# Patient Record
Sex: FEMALE | Race: AMERICAN INDIAN OR ALASKA NATIVE
[De-identification: names, ages, dates, MRNs, and addresses within clinical notes are randomized per-mention and may not be internally consistent; named-entity substitution may affect disease eponyms.]

---

## 2021-02-24 ENCOUNTER — HOSPITAL ENCOUNTER (EMERGENCY)
Dept: HOSPITAL 5 - ED | Age: 36
Discharge: HOME | End: 2021-02-24
Payer: MEDICAID

## 2021-02-24 VITALS — DIASTOLIC BLOOD PRESSURE: 90 MMHG | SYSTOLIC BLOOD PRESSURE: 130 MMHG

## 2021-02-24 DIAGNOSIS — K29.70: Primary | ICD-10-CM

## 2021-02-24 DIAGNOSIS — Z79.899: ICD-10-CM

## 2021-02-24 DIAGNOSIS — F17.200: ICD-10-CM

## 2021-02-24 LAB
ALBUMIN SERPL-MCNC: 4.1 G/DL (ref 3.9–5)
ALT SERPL-CCNC: 10 UNITS/L (ref 7–56)
BASOPHILS # (AUTO): 0 K/MM3 (ref 0–0.1)
BASOPHILS NFR BLD AUTO: 0.6 % (ref 0–1.8)
BILIRUB UR QL STRIP: (no result)
BLOOD UR QL VISUAL: (no result)
BUN SERPL-MCNC: 7 MG/DL (ref 7–17)
BUN/CREAT SERPL: 14 %
CALCIUM SERPL-MCNC: 8.8 MG/DL (ref 8.4–10.2)
EOSINOPHIL # BLD AUTO: 0.1 K/MM3 (ref 0–0.4)
EOSINOPHIL NFR BLD AUTO: 1 % (ref 0–4.3)
HCT VFR BLD CALC: 41.6 % (ref 30.3–42.9)
HEMOLYSIS INDEX: 7
HGB BLD-MCNC: 14.2 GM/DL (ref 10.1–14.3)
LYMPHOCYTES # BLD AUTO: 2 K/MM3 (ref 1.2–5.4)
LYMPHOCYTES NFR BLD AUTO: 24.4 % (ref 13.4–35)
MCHC RBC AUTO-ENTMCNC: 34 % (ref 30–34)
MCV RBC AUTO: 100 FL (ref 79–97)
MONOCYTES # (AUTO): 0.8 K/MM3 (ref 0–0.8)
MONOCYTES % (AUTO): 9.5 % (ref 0–7.3)
MUCOUS THREADS #/AREA URNS HPF: (no result) /HPF
PH UR STRIP: 6 [PH] (ref 5–7)
PLATELET # BLD: 312 K/MM3 (ref 140–440)
RBC # BLD AUTO: 4.15 M/MM3 (ref 3.65–5.03)
RBC #/AREA URNS HPF: 7 /HPF (ref 0–6)
UROBILINOGEN UR-MCNC: 4 MG/DL (ref ?–2)
WBC #/AREA URNS HPF: 2 /HPF (ref 0–6)

## 2021-02-24 PROCEDURE — 99284 EMERGENCY DEPT VISIT MOD MDM: CPT

## 2021-02-24 PROCEDURE — 80053 COMPREHEN METABOLIC PANEL: CPT

## 2021-02-24 PROCEDURE — 96361 HYDRATE IV INFUSION ADD-ON: CPT

## 2021-02-24 PROCEDURE — 85025 COMPLETE CBC W/AUTO DIFF WBC: CPT

## 2021-02-24 PROCEDURE — 84702 CHORIONIC GONADOTROPIN TEST: CPT

## 2021-02-24 PROCEDURE — 76802 OB US < 14 WKS ADDL FETUS: CPT

## 2021-02-24 PROCEDURE — 81001 URINALYSIS AUTO W/SCOPE: CPT

## 2021-02-24 PROCEDURE — 83690 ASSAY OF LIPASE: CPT

## 2021-02-24 PROCEDURE — 76801 OB US < 14 WKS SINGLE FETUS: CPT

## 2021-02-24 PROCEDURE — 36415 COLL VENOUS BLD VENIPUNCTURE: CPT

## 2021-02-24 PROCEDURE — 96374 THER/PROPH/DIAG INJ IV PUSH: CPT

## 2021-02-24 NOTE — ULTRASOUND REPORT
ULTRASOUND OBSTETRIC 



INDICATION:

Pelvic pain. 9 weeks pregnant.



TECHNIQUE:

Transabdominal and Transvaginal.



COMPARISON:

None available.



FINDINGS:

GESTATIONAL SAC: Well-defined single sac of oval shape and intrauterine in location. 

YOLK SAC: No significant abnormality.



EMBRYO/FETUS A: No significant abnormality. 

- Crown-Rump Length = 2.3 cm = 9 weeks, 0 day(s).

- Fetal Heart Rate = 176 beats per minute. 



EMBRYO/FETUS B: No significant abnormality. 

- Crown-Rump Length = 2.6 cm = 9 weeks, 3 day(s).

- Fetal Heart Rate = 177 beats per minute. 





ADNEXA: No significant abnormality.

FREE FLUID: None.



ADDITIONAL FINDINGS: None.



IMPRESSION:

Living monochorionic monoamniotic intrauterine twin pregnancy with estimated sonographic age of 9 wee
ks, 3 day(s).



Signer Name: Kobe Ritchie MD 

Signed: 2/24/2021 8:51 PM

Workstation Name: [id number]

## 2021-02-24 NOTE — EMERGENCY DEPARTMENT REPORT
ED General Adult HPI





- General


Chief complaint: Nausea/Vomiting/Diarrhea


Stated complaint: FOOD POISONING


Time Seen by Provider: 21 17:15


Source: patient


Mode of arrival: Ambulatory


Limitations: No Limitations





- History of Present Illness


Initial comments: 





36-year-old -American female patient presents with complaints of sudden 

onset of nausea and vomiting starting around 4 AM.  Patient states she is 8 

weeks pregnant and is currently following with an OB/GYN whose name she cannot 

remember at this time.  She is G7, .  She reports she does have some lower 

abdominal pain that started after the vomiting, however she denies any dysuria

/hematuria/vaginal discharge, vaginal bleeding, dyspareunia, 

diarrhea/constipation, or fever/chills/sweats.  No loss of taste/smell, chest 

pain, cough, or shortness of breath per patient.  She rates her current pain as 

a 5/10 in severity.  She states that she did eat out last night around 10 PM.  

No other prior medical history per patient.





- Related Data


                                  Previous Rx's











 Medication  Instructions  Recorded  Last Taken  Type


 


Metoclopramide [Reglan] 10 mg PO TID PRN #10 tab 21 Unknown Rx


 


diphenhydrAMINE [Benadryl CAP] 25 mg PO TID PRN #10 capsule 21 Unknown Rx











                                    Allergies











Allergy/AdvReac Type Severity Reaction Status Date / Time


 


No Known Allergies Allergy   Verified 21 17:06














ED Review of Systems


ROS: 


Stated complaint: FOOD POISONING


Other details as noted in HPI





Constitutional: denies: chills, diaphoresis, fever, malaise, weakness


ENT: denies: ear pain


Respiratory: denies: cough, shortness of breath


Cardiovascular: denies: chest pain


Gastrointestinal: abdominal pain, nausea, vomiting.  denies: diarrhea, 

constipation, hematemesis, melena, hematochezia


Genitourinary: denies: urgency, dysuria, frequency, hematuria, discharge, 

abnormal menses, dyspareunia


Musculoskeletal: denies: back pain


Skin: denies: rash, lesions, change in color


Neurological: denies: headache


Hematological/Lymphatic: denies: swollen glands





ED Past Medical Hx





- Social History


Smoking Status: Current Every Day Smoker


Substance Use Type: Alcohol





- Medications


Home Medications: 


                                Home Medications











 Medication  Instructions  Recorded  Confirmed  Last Taken  Type


 


Metoclopramide [Reglan] 10 mg PO TID PRN #10 tab 21  Unknown Rx


 


diphenhydrAMINE [Benadryl CAP] 25 mg PO TID PRN #10 capsule 21  Unknown Rx














ED Physical Exam





- General


Limitations: No Limitations


General appearance: alert, in no apparent distress, other (Active vomiting)





- Head


Head exam: Present: atraumatic, normocephalic





- Eye


Eye exam: Present: scleral icterus.  Absent: PERRL





- ENT


ENT exam: Present: mucous membranes moist





- Neck


Neck exam: Present: normal inspection





- Respiratory


Respiratory exam: Present: normal lung sounds bilaterally.  Absent: respiratory 

distress





- Cardiovascular


Cardiovascular Exam: Present: regular rate, normal rhythm





- GI/Abdominal


GI/Abdominal exam: Present: soft, tenderness (Mild suprapubic tenderness to 

palpation), normal bowel sounds.  Absent: distended, guarding, rebound, rigid





- Extremities Exam


Extremities exam: Present: full ROM





- Back Exam


Back exam: Absent: CVA tenderness (R), CVA tenderness (L)





- Neurological Exam


Neurological exam: Present: alert, oriented X3, normal gait





- Psychiatric


Psychiatric exam: Present: normal affect, normal mood





- Skin


Skin exam: Present: warm, dry, intact, normal color.  Absent: rash, cyanosis, 

diaphoretic, ecchymosis





ED Course


                                   Vital Signs











  21





  17:05


 


Temperature 98.7 F


 


Pulse Rate 74


 


Respiratory 22





Rate 


 


Blood Pressure 140/73


 


O2 Sat by Pulse 95





Oximetry 














ED Medical Decision Making





- Lab Data


Result diagrams: 


                                 21 18:08





                                 21 18:08








                                   Lab Results











  21 Range/Units





  18:08 18:08 18:08 


 


WBC  8.3    (4.5-11.0)  K/mm3


 


RBC  4.15    (3.65-5.03)  M/mm3


 


Hgb  14.2    (10.1-14.3)  gm/dl


 


Hct  41.6    (30.3-42.9)  %


 


MCV  100 H    (79-97)  fl


 


MCH  34 H    (28-32)  pg


 


MCHC  34    (30-34)  %


 


RDW  12.7 L    (13.2-15.2)  %


 


Plt Count  312    (140-440)  K/mm3


 


Lymph % (Auto)  24.4    (13.4-35.0)  %


 


Mono % (Auto)  9.5 H    (0.0-7.3)  %


 


Eos % (Auto)  1.0    (0.0-4.3)  %


 


Baso % (Auto)  0.6    (0.0-1.8)  %


 


Lymph # (Auto)  2.0    (1.2-5.4)  K/mm3


 


Mono # (Auto)  0.8    (0.0-0.8)  K/mm3


 


Eos # (Auto)  0.1    (0.0-0.4)  K/mm3


 


Baso # (Auto)  0.0    (0.0-0.1)  K/mm3


 


Seg Neutrophils %  64.5    (40.0-70.0)  %


 


Seg Neutrophils #  5.3    (1.8-7.7)  K/mm3


 


Sodium   137   (137-145)  mmol/L


 


Potassium   3.5 L   (3.6-5.0)  mmol/L


 


Chloride   103.5   ()  mmol/L


 


Carbon Dioxide   26   (22-30)  mmol/L


 


Anion Gap   11   mmol/L


 


BUN   7   (7-17)  mg/dL


 


Creatinine   0.5 L   (0.6-1.2)  mg/dL


 


Estimated GFR   > 60   ml/min


 


BUN/Creatinine Ratio   14   %


 


Glucose   80   ()  mg/dL


 


Calcium   8.8   (8.4-10.2)  mg/dL


 


Total Bilirubin   0.50   (0.1-1.2)  mg/dL


 


AST   13   (5-40)  units/L


 


ALT   10   (7-56)  units/L


 


Alkaline Phosphatase   38   ()  units/L


 


Total Protein   6.9   (6.3-8.2)  g/dL


 


Albumin   4.1   (3.9-5)  g/dL


 


Albumin/Globulin Ratio   1.5   %


 


Lipase   14   (13-60)  units/L


 


HCG, Quant    148019 H  (0-4)  mIU/mL


 


Urine Color     (Yellow)  


 


Urine Turbidity     (Clear)  


 


Urine pH     (5.0-7.0)  


 


Ur Specific Gravity     (1.003-1.030)  


 


Urine Protein     (Negative)  mg/dL


 


Urine Glucose (UA)     (Negative)  mg/dL


 


Urine Ketones     (Negative)  mg/dL


 


Urine Blood     (Negative)  


 


Urine Nitrite     (Negative)  


 


Urine Bilirubin     (Negative)  


 


Urine Urobilinogen     (<2.0)  mg/dL


 


Ur Leukocyte Esterase     (Negative)  


 


Urine WBC (Auto)     (0.0-6.0)  /HPF


 


Urine RBC (Auto)     (0.0-6.0)  /HPF


 


U Epithel Cells (Auto)     (0-13.0)  /HPF


 


Urine Mucus     /HPF














  21 Range/Units





  18:52 


 


WBC   (4.5-11.0)  K/mm3


 


RBC   (3.65-5.03)  M/mm3


 


Hgb   (10.1-14.3)  gm/dl


 


Hct   (30.3-42.9)  %


 


MCV   (79-97)  fl


 


MCH   (28-32)  pg


 


MCHC   (30-34)  %


 


RDW   (13.2-15.2)  %


 


Plt Count   (140-440)  K/mm3


 


Lymph % (Auto)   (13.4-35.0)  %


 


Mono % (Auto)   (0.0-7.3)  %


 


Eos % (Auto)   (0.0-4.3)  %


 


Baso % (Auto)   (0.0-1.8)  %


 


Lymph # (Auto)   (1.2-5.4)  K/mm3


 


Mono # (Auto)   (0.0-0.8)  K/mm3


 


Eos # (Auto)   (0.0-0.4)  K/mm3


 


Baso # (Auto)   (0.0-0.1)  K/mm3


 


Seg Neutrophils %   (40.0-70.0)  %


 


Seg Neutrophils #   (1.8-7.7)  K/mm3


 


Sodium   (137-145)  mmol/L


 


Potassium   (3.6-5.0)  mmol/L


 


Chloride   ()  mmol/L


 


Carbon Dioxide   (22-30)  mmol/L


 


Anion Gap   mmol/L


 


BUN   (7-17)  mg/dL


 


Creatinine   (0.6-1.2)  mg/dL


 


Estimated GFR   ml/min


 


BUN/Creatinine Ratio   %


 


Glucose   ()  mg/dL


 


Calcium   (8.4-10.2)  mg/dL


 


Total Bilirubin   (0.1-1.2)  mg/dL


 


AST   (5-40)  units/L


 


ALT   (7-56)  units/L


 


Alkaline Phosphatase   ()  units/L


 


Total Protein   (6.3-8.2)  g/dL


 


Albumin   (3.9-5)  g/dL


 


Albumin/Globulin Ratio   %


 


Lipase   (13-60)  units/L


 


HCG, Quant   (0-4)  mIU/mL


 


Urine Color  Yellow  (Yellow)  


 


Urine Turbidity  Clear  (Clear)  


 


Urine pH  6.0  (5.0-7.0)  


 


Ur Specific Gravity  1.033 H  (1.003-1.030)  


 


Urine Protein  30 mg/dl  (Negative)  mg/dL


 


Urine Glucose (UA)  Neg  (Negative)  mg/dL


 


Urine Ketones  20  (Negative)  mg/dL


 


Urine Blood  Sm  (Negative)  


 


Urine Nitrite  Neg  (Negative)  


 


Urine Bilirubin  Neg  (Negative)  


 


Urine Urobilinogen  4.0  (<2.0)  mg/dL


 


Ur Leukocyte Esterase  Neg  (Negative)  


 


Urine WBC (Auto)  2.0  (0.0-6.0)  /HPF


 


Urine RBC (Auto)  7.0  (0.0-6.0)  /HPF


 


U Epithel Cells (Auto)  4.0  (0-13.0)  /HPF


 


Urine Mucus  3+  /HPF














- Radiology Data


Radiology results: report reviewed








 ULTRASOUND OBSTETRIC 





INDICATION: 


Pelvic pain. 9 weeks pregnant. 





TECHNIQUE: 


Transabdominal and Transvaginal. 





COMPARISON: 


None available. 





FINDINGS: 


GESTATIONAL SAC: Well-defined single sac of oval shape and intrauterine in 

location. 


YOLK SAC: No significant abnormality. 





EMBRYO/FETUS A: No significant abnormality. 


- Crown-Rump Length = 2.3 cm = 9 weeks, 0 day(s). 


- Fetal Heart Rate = 176 beats per minute. 





EMBRYO/FETUS B: No significant abnormality. 


- Crown-Rump Length = 2.6 cm = 9 weeks, 3 day(s). 


- Fetal Heart Rate = 177 beats per minute. 








ADNEXA: No significant abnormality. 


FREE FLUID: None. 





ADDITIONAL FINDINGS: None. 





IMPRESSION: 


Living monochorionic monoamniotic intrauterine twin pregnancy with estimated 

sonographic age of 9 


 weeks, 3 day(s). 





- Medical Decision Making











36-year-old -American female patient presents with complaints of sudden 

onset of nausea and vomiting starting around 4 AM.  Patient states she is 8 

weeks pregnant and is currently following with an OB/GYN whose name she cannot 

remember at this time.  She is G7, .  She reports she does have some lower 

abdominal pain that started after the vomiting, however she denies any d

ysuria/hematuria/vaginal discharge, vaginal bleeding, dyspareunia, 

diarrhea/constipation, or fever/chills/sweats.  No loss of taste/smell, chest 

pain, cough, or shortness of breath per patient.  She rates her current pain as 

a 5/10 in severity.  She states that she did eat out last night around 10 PM.  

No other prior medical history per patient.








Patient given Reglan and Benadryl and 1 L of lactated Ringer's.  Her vomiting 

has been controlled here in the ED and she is tolerating fluids p.o.  No acute 

abnormalities noted on CBC, CMP, or UA.  Ultrasound shows viable twin IUP 

without any acute abnormalities.  Vitals remain normal.  Patient is well-

appearing and stable for discharge home.  Prescription for Reglan and Benadryl 

given for home.  Suspect patient symptoms were due to viral gastritis and 

recommend follow-up with primary care in 3 to 5 days.  Signs and symptoms that 

should prompt immediate return to the ED were discussed in great detail with 

patient who verbalized understanding.


Critical care attestation.: 


If time is entered above; I have spent that time in minutes in the direct care 

of this critically ill patient, excluding procedure time.








ED Disposition


Clinical Impression: 


 Viral gastritis





Disposition: DC- TO HOME OR SELFCARE


Is pt being admited?: No


Condition: Stable


Instructions:  Gastritis, Adult


Prescriptions: 


diphenhydrAMINE [Benadryl CAP] 25 mg PO TID PRN #10 capsule


 PRN Reason: Nausea


Metoclopramide [Reglan] 10 mg PO TID PRN #10 tab


 PRN Reason: Nausea


Referrals: 


PRIMARY CARE,MD [Primary Care Provider] - 2-3 Days

## 2021-02-24 NOTE — ULTRASOUND REPORT
ULTRASOUND OBSTETRIC 



INDICATION:

Pelvic pain. 9 weeks pregnant.



TECHNIQUE:

Transabdominal and Transvaginal.



COMPARISON:

None available.



FINDINGS:

GESTATIONAL SAC: Well-defined single sac of oval shape and intrauterine in location. 

YOLK SAC: No significant abnormality.



EMBRYO/FETUS A: No significant abnormality. 

- Crown-Rump Length = 2.3 cm = 9 weeks, 0 day(s).

- Fetal Heart Rate = 176 beats per minute. 



EMBRYO/FETUS B: No significant abnormality. 

- Crown-Rump Length = 2.6 cm = 9 weeks, 3 day(s).

- Fetal Heart Rate = 177 beats per minute. 





ADNEXA: No significant abnormality.

FREE FLUID: None.



ADDITIONAL FINDINGS: None.



IMPRESSION:

Living monochorionic monoamniotic intrauterine twin pregnancy with estimated sonographic age of 9 wee
ks, 3 day(s).



Signer Name: Kobe Ritchie MD 

Signed: 2/24/2021 8:51 PM

Workstation Name: [id number]

## 2021-12-14 ENCOUNTER — HOSPITAL ENCOUNTER (EMERGENCY)
Dept: HOSPITAL 5 - ED | Age: 36
End: 2021-12-14
Payer: MEDICAID

## 2021-12-14 VITALS — DIASTOLIC BLOOD PRESSURE: 103 MMHG | SYSTOLIC BLOOD PRESSURE: 138 MMHG

## 2021-12-14 DIAGNOSIS — Z53.21: ICD-10-CM

## 2021-12-14 DIAGNOSIS — M54.9: Primary | ICD-10-CM

## 2021-12-15 ENCOUNTER — HOSPITAL ENCOUNTER (EMERGENCY)
Dept: HOSPITAL 5 - ED | Age: 36
Discharge: HOME | End: 2021-12-15
Payer: MEDICAID

## 2021-12-15 VITALS — DIASTOLIC BLOOD PRESSURE: 80 MMHG | SYSTOLIC BLOOD PRESSURE: 122 MMHG

## 2021-12-15 DIAGNOSIS — F17.200: ICD-10-CM

## 2021-12-15 DIAGNOSIS — M54.50: Primary | ICD-10-CM

## 2021-12-15 LAB
BILIRUB UR QL STRIP: (no result)
BLOOD UR QL VISUAL: (no result)
MUCOUS THREADS #/AREA URNS HPF: (no result) /HPF
PH UR STRIP: 5 [PH] (ref 5–7)
PROT UR STRIP-MCNC: (no result) MG/DL
RBC #/AREA URNS HPF: 13 /HPF (ref 0–6)
UROBILINOGEN UR-MCNC: < 2 MG/DL (ref ?–2)
WBC #/AREA URNS HPF: 3 /HPF (ref 0–6)

## 2021-12-15 PROCEDURE — 99283 EMERGENCY DEPT VISIT LOW MDM: CPT

## 2021-12-15 PROCEDURE — 81025 URINE PREGNANCY TEST: CPT

## 2021-12-15 PROCEDURE — 96372 THER/PROPH/DIAG INJ SC/IM: CPT

## 2021-12-15 PROCEDURE — 81001 URINALYSIS AUTO W/SCOPE: CPT

## 2021-12-15 NOTE — EMERGENCY DEPARTMENT REPORT
ED General Adult HPI





- General


Chief complaint: Back Pain/Injury


Stated complaint: BACK PAIN


Time Seen by Provider: 12/15/21 07:16


Source: patient


Mode of arrival: Ambulatory


Limitations: No Limitations





- History of Present Illness


Initial comments: 





36-year-old -American female patient presents with complaints of sudden 

onset of low back pain starting yesterday.  Patient states the pain began 

suddenly when she went to sit in a deep seated chair.  She describes the pain as

sharp and stabbing and states it radiates down her right leg.  Pain worsens with

trying to get up from a seated position and ambulation per patient.  She denies 

any past medical history including history of cancer.  No loss of bladder/bowel 

control, numbness/tingling/weakness in her limbs, or difficulty with movement of

the legs per patient.  She has not tried any medications for her symptoms per 

patient.  She rates the pain as a 9/10 in severity.


Severity scale (0 -10): 8





- Related Data


                                  Previous Rx's











 Medication  Instructions  Recorded  Last Taken  Type


 


Metoclopramide [Reglan] 10 mg PO TID PRN #10 tab 21 Unknown Rx


 


diphenhydrAMINE [Benadryl CAP] 25 mg PO TID PRN #10 capsule 21 Unknown Rx


 


Naproxen 500 mg PO BID PRN #20 tablet 12/15/21 Unknown Rx


 


Prednisone [predniSONE 10 mg 10 mg PO .TAPER #1 tab.ds.pk 12/15/21 Unknown Rx





(6-Day Pack, 21 Tabs)]    


 


methocarbamoL [Methocarbamol] 750 - 1,500 mg PO TID PRN #30 12/15/21 Unknown Rx





 tablet   











                                    Allergies











Allergy/AdvReac Type Severity Reaction Status Date / Time


 


No Known Allergies Allergy   Verified 12/15/21 05:15














ED Review of Systems


ROS: 


Stated complaint: BACK PAIN


Other details as noted in HPI





Constitutional: denies: chills, fever, malaise


Respiratory: denies: shortness of breath


Cardiovascular: denies: chest pain


Gastrointestinal: denies: abdominal pain, nausea, vomiting


Genitourinary: denies: urgency, dysuria, frequency


Musculoskeletal: back pain


Skin: denies: change in color


Neurological: denies: numbness, paresthesias





ED Past Medical Hx





- Past Medical History


Previous Medical History?: No





- Surgical History


Additional Surgical History: 





- Social History


Smoking Status: Current Every Day Smoker


Substance Use Type: Alcohol





- Medications


Home Medications: 


                                Home Medications











 Medication  Instructions  Recorded  Confirmed  Last Taken  Type


 


Metoclopramide [Reglan] 10 mg PO TID PRN #10 tab 21  Unknown Rx


 


diphenhydrAMINE [Benadryl CAP] 25 mg PO TID PRN #10 capsule 21  Unknown Rx


 


Naproxen 500 mg PO BID PRN #20 tablet 12/15/21  Unknown Rx


 


Prednisone [predniSONE 10 mg 10 mg PO .TAPER #1 tab.ds.pk 12/15/21  Unknown Rx





(6-Day Pack, 21 Tabs)]     


 


methocarbamoL [Methocarbamol] 750 - 1,500 mg PO TID PRN #30 12/15/21  Unknown Rx





 tablet    














ED Physical Exam





- General


Limitations: No Limitations


General appearance: alert, in no apparent distress





- Head


Head exam: Present: atraumatic, normocephalic





- Eye


Eye exam: Present: normal appearance.  Absent: scleral icterus





- Respiratory


Respiratory exam: Absent: respiratory distress





- Cardiovascular


Cardiovascular Exam: Present: regular rate





- Extremities Exam


Extremities exam: Present: full ROM





- Back Exam


Back exam: Absent: paraspinal tenderness, vertebral tenderness





- Expanded Back Exam


  ** Expanded


Back exam: Absent: saddle anesthesia


Back exam: Positive Straight Leg Raise: Right





- Neurological Exam


Neurological exam: Present: alert, oriented X3





- Psychiatric


Psychiatric exam: Present: normal affect, normal mood





- Skin


Skin exam: Present: warm, dry, intact, normal color.  Absent: rash





ED Course





                                   Vital Signs











  12/15/21





  05:13


 


Temperature 98.3 F


 


Pulse Rate 84


 


Respiratory 18





Rate 


 


Blood Pressure 123/89





[Left] 


 


O2 Sat by Pulse 97





Oximetry 














ED Medical Decision Making





- Medical Decision Making








36-year-old -American female patient presents with complaints of sudden 

onset of low back pain starting yesterday.  Patient states the pain began 

suddenly when she went to sit in a deep seated chair.  She describes the pain as

 sharp and stabbing and states it radiates down her right leg.  Pain worsens 

with trying to get up from a seated position and ambulation per patient.  She 

denies any past medical history including history of cancer.  No loss of 

bladder/bowel control, numbness/tingling/weakness in her limbs, or difficulty 

with movement of the legs per patient.  She has not tried any medications for 

her symptoms per patient.  She rates the pain as a 9/10 in severity.








Positive right straight leg raise test without acute abnormalities of the spine 

noted.  She denies any red flag symptoms. Will treat for sciatica with NSAIDs, 

muscle relaxers, icing, stretching, and rest.  Recommend follow-up with primary 

care provider, referral provided.  Patient to also have her blood pressure 

rechecked with PCP.  Patient is well-appearing, her vitals are within normal 

limits, she is stable for discharge home.  Discussed in detail signs and 

symptoms that should prompt immediate return to the ED with patient who 

verbalizes understanding


Critical care attestation.: 


If time is entered above; I have spent that time in minutes in the direct care 

of this critically ill patient, excluding procedure time.








ED Disposition


Clinical Impression: 


 Acute low back pain





Disposition: 01 HOME / SELF CARE / HOMELESS


Is pt being admited?: No


Condition: Stable


Instructions:  Acute Back Pain, Adult, Sciatica


Prescriptions: 


methocarbamoL [Methocarbamol] 750 - 1,500 mg PO TID PRN #30 tablet


 PRN Reason: muscle spasm/tightness


Naproxen 500 mg PO BID PRN #20 tablet


 PRN Reason: pain


Prednisone [predniSONE 10 mg (6-Day Pack, 21 Tabs)] 10 mg PO .TAPER #1 tab.ds.pk


Referrals: 


[facility] [Provider Group] - 3-5 Days


Forms:  Work/School Release Form(ED)

## 2022-02-08 ENCOUNTER — HOSPITAL ENCOUNTER (EMERGENCY)
Dept: HOSPITAL 5 - ED | Age: 37
Discharge: HOME | End: 2022-02-08
Payer: MEDICAID

## 2022-02-08 VITALS — DIASTOLIC BLOOD PRESSURE: 66 MMHG | SYSTOLIC BLOOD PRESSURE: 136 MMHG

## 2022-02-08 DIAGNOSIS — F17.200: ICD-10-CM

## 2022-02-08 DIAGNOSIS — F10.20: ICD-10-CM

## 2022-02-08 DIAGNOSIS — H72.92: Primary | ICD-10-CM

## 2022-02-08 PROCEDURE — 99282 EMERGENCY DEPT VISIT SF MDM: CPT

## 2022-02-08 NOTE — EMERGENCY DEPARTMENT REPORT
Minor Respiratory





- HPI


Chief Complaint: Earache


Stated Complaint: LOW HEARING IN LEFT EAR


Time Seen by Provider: 22 15:12


Duration: 2 Days


Pain Location: Ear


Severity: moderate


Minor Respiratory: Yes Able to Tolerate Fluids, Yes Ear Pain, No Rhinorrhea, No 

Sore Throat, No Cough, No Sick Contacts, No Hemoptysis, No Chest Pain, No 

Shortness of Breath, No Fever


Other History: 36 yo comes to er with dec hearing from left ear. no trauma. no 

uri.  she was cleaning ear with qtip and then started to have pain.





ED Review of Systems


ROS: 


Stated complaint: LOW HEARING IN LEFT EAR


Other details as noted in HPI





Comment: All other systems reviewed and negative





ED Past Medical Hx





- Past Medical History


Previous Medical History?: No





- Surgical History


Past Surgical History?: No


Additional Surgical History: 





- Family History


Family history: no significant





- Social History


Smoking Status: Current Every Day Smoker


Substance Use Type: Alcohol





- Medications


Home Medications: 


                                Home Medications











 Medication  Instructions  Recorded  Confirmed  Last Taken  Type


 


Amoxicillin [Trimox CAP] 500 mg PO BID #20 capsule 22  Unknown Rx














Minor Respiratory Exam





- Exam


General: 


Vital signs noted. No distress. Alert and acting appropriately.





HEENT: Yes Moist Mucous Membranes, No Pharyngeal Erythema, No Pharyngeal 

Exudates, No Rhinorrhea, No Conjuctival Injection, No Frontal Tenderness, No 

Maxillary Tenderness


Ear: Left TM Erythema (left ruptured tm), Neither TM Bulge, Neither EAC Pain, 

Neither EAC Discharge


Neck: Yes Supple, No Adenopathy


Lungs: Yes Good Air Exchange, No Wheezes, No Ronchi, No Stridor, No Cough, No 

Labored Respirations, No Retractions, No Use of Accessory Muscles, No Other 

Abnormal Lung Sounds


Heart: Yes Regular, No Murmur


Abdomen: Yes Normal Bowel Sounds, No Tenderness, No Peritoneal Signs


Skin: No Rash, No Edema


Neurologic: 


Alert and oriented, no deficits.








Musculoskeletal: 


Unremarkable.











ED Course


                                   Vital Signs











  22





  14:53


 


Temperature 98.9 F


 


Pulse Rate 61


 


Respiratory 16





Rate 


 


Blood Pressure 142/71





[Left] 


 


O2 Sat by Pulse 100





Oximetry 














ED Medical Decision Making





- Medical Decision Making





                             Vital Signs (72 hours)











  22





  14:53


 


Temperature 98.9 F


 


Pulse Rate 61


 


Respiratory 16





Rate 


 


Blood Pressure 142/71





[Left] 


 


O2 Sat by Pulse 100





Oximetry 








ruptured left TM





pt educated on med/nothing going in her hear and ENT follow up





She verbalizes understanding of dc plan of care. 





Pt is ambulatory non ill and not toxic on exam 





- Differential Diagnosis


om/oe/cerumen impaction


Critical care attestation.: 


If time is entered above; I have spent that time in minutes in the direct care 

of this critically ill patient, excluding procedure time.








ED Disposition


Clinical Impression: 


 Ruptured ear drum





Disposition: 01 HOME / SELF CARE / HOMELESS


Is pt being admited?: No


Does the pt Need Aspirin: No


Condition: Stable


Instructions:  Eardrum Rupture, Easy-to-Read


Additional Instructions: 


motrin or tylenol for pain





med as ordered today





nothing in ear





follow up with ent


referral below





Prescriptions: 


Amoxicillin [Trimox CAP] 500 mg PO BID #20 capsule


Referrals: 


TREMAINE LIZ MD [Staff Physician] - 3-5 Days


LAUREL CROWE MD [Staff Physician] - 3-5 Days


Time of Disposition: 15:13